# Patient Record
Sex: FEMALE | Race: WHITE | NOT HISPANIC OR LATINO | URBAN - METROPOLITAN AREA
[De-identification: names, ages, dates, MRNs, and addresses within clinical notes are randomized per-mention and may not be internally consistent; named-entity substitution may affect disease eponyms.]

---

## 2022-04-28 ENCOUNTER — OFFICE VISIT (OUTPATIENT)
Dept: URBAN - METROPOLITAN AREA CLINIC 51 | Facility: CLINIC | Age: 67
End: 2022-04-28
Payer: COMMERCIAL

## 2022-04-28 DIAGNOSIS — H43.313 VITREOUS MEMBRANES AND STRANDS, BILATERAL: ICD-10-CM

## 2022-04-28 DIAGNOSIS — H02.834 DERMATOCHALASIS OF LEFT UPPER EYELID: ICD-10-CM

## 2022-04-28 DIAGNOSIS — Z79.84 LONG TERM (CURRENT) USE OF ORAL ANTIDIABETIC DRUGS: ICD-10-CM

## 2022-04-28 DIAGNOSIS — H40.013 OPEN ANGLE WITH BORDERLINE FINDINGS, LOW RISK, BILATERAL: ICD-10-CM

## 2022-04-28 DIAGNOSIS — H35.363 DRUSEN (DEGENERATIVE) OF MACULA, BILATERAL: ICD-10-CM

## 2022-04-28 DIAGNOSIS — H52.4 PRESBYOPIA: ICD-10-CM

## 2022-04-28 DIAGNOSIS — H00.022 HORDEOLUM INTERNUM RIGHT LOWER EYELID: Primary | ICD-10-CM

## 2022-04-28 DIAGNOSIS — H25.13 AGE-RELATED NUCLEAR CATARACT, BILATERAL: ICD-10-CM

## 2022-04-28 DIAGNOSIS — E11.9 TYPE 2 DIABETES MELLITUS WITHOUT COMPLICATIONS: ICD-10-CM

## 2022-04-28 DIAGNOSIS — H02.831 DERMATOCHALASIS OF RIGHT UPPER EYELID: ICD-10-CM

## 2022-04-28 DIAGNOSIS — H44.23 DEGENERATIVE MYOPIA, BILATERAL: ICD-10-CM

## 2022-04-28 PROCEDURE — 92133 CPTRZD OPH DX IMG PST SGM ON: CPT | Performed by: OPTOMETRIST

## 2022-04-28 PROCEDURE — 99204 OFFICE O/P NEW MOD 45 MIN: CPT | Performed by: OPTOMETRIST

## 2022-04-28 PROCEDURE — 92250 FUNDUS PHOTOGRAPHY W/I&R: CPT | Performed by: OPTOMETRIST

## 2022-04-28 PROCEDURE — 92134 CPTRZ OPH DX IMG PST SGM RTA: CPT | Performed by: OPTOMETRIST

## 2022-04-28 RX ORDER — OMEPRAZOLE 20 MG/1
20 MG CAPSULE, DELAYED RELEASE ORAL
Qty: 0 | Refills: 0 | Status: ACTIVE
Start: 2022-04-28

## 2022-04-28 RX ORDER — NEOMYCIN SULFATE, POLYMYXIN B SULFATE AND DEXAMETHASONE 3.5; 10000; 1 MG/G; [USP'U]/G; MG/G
OINTMENT OPHTHALMIC
Qty: 3.5 | Refills: 0 | Status: ACTIVE
Start: 2022-04-28

## 2022-04-28 RX ORDER — TOBRAMYCIN AND DEXAMETHASONE 3; 1 MG/G; MG/G
OINTMENT OPHTHALMIC
Qty: 5 | Refills: 0 | Status: ACTIVE
Start: 2022-04-28

## 2022-04-28 RX ORDER — LEVOTHYROXINE SODIUM 75 UG/1
CAPSULE ORAL
Qty: 0 | Refills: 0 | Status: ACTIVE
Start: 2022-04-28

## 2022-04-28 RX ORDER — METFORMIN HYDROCHLORIDE 500 MG/1
500 MG TABLET, FILM COATED ORAL
Qty: 0 | Refills: 0 | Status: ACTIVE
Start: 2022-04-28

## 2022-04-28 ASSESSMENT — VISUAL ACUITY
OD: 20/25
OS: 20/20

## 2022-04-28 ASSESSMENT — INTRAOCULAR PRESSURE
OD: 17
OS: 17

## 2022-04-28 NOTE — IMPRESSION/PLAN
Impression: Drusen (degenerative) of macula, bilateral: H35.363. Plan: Minor. Recommend dark-leafy vegetables, smoking cessation, sun protection. RTC if any visual distortion noted.

## 2022-04-28 NOTE — IMPRESSION/PLAN
Impression: Age-related nuclear cataract, bilateral: H25.13. Plan: Not visually significant, cataract extraction not indicated at this time. Sunglasses/sun protection when outdoors. Continue to monitor. RTC if ADL affected.

## 2022-04-28 NOTE — IMPRESSION/PLAN
Impression: Diagnosis: Open angle with borderline findings, low risk, bilateral.
*IOPs today 17mmHg OD and 17mmHg OS without medication *OCT RNFL (04/28/2022) OD: 108um; borderline thinning sup OS: 98um; borderline thinning sup *Prev REFERRED BY DR Annette Howell OD 
*GONIO GRADE 3 OU *PACHS [id number] *PT DENIES SULFA ALLERGY
*PT DENIES LUNG DZ Plan: I counseled the patient regarding the following: Glaucoma suspects do not usually need to be treated but do need close follow up monitoring for early signs of glaucoma damage. Some glaucoma suspects require treatment, and the same medical options for glaucoma eye drops and pills are utilized. Laser procedures and intraocular surgery are usually reserved for patients with uncontrolled glaucoma. Expectations: Glaucoma is usually diagnosed when the following three criteria are present: elevated intraocular pressure, optic nerve damage, and visual field loss. When some of these criteria are missing, it can be challenging to make the diagnosis of glaucoma. Glaucoma suspects may also include individuals with other risk factors such as a family history of glaucoma, temporary elevations of IO when taking steroid medications, pseudoexfoliation syndrome, pigmentary dispersion syndrome, thin central corneal thickness, and optic disc hemorrhages. Check IOP, OCT RNFL and HVF annually at [facility].

## 2022-04-28 NOTE — IMPRESSION/PLAN
Impression: Hordeolum internum right lower eyelid: H00.022. Plan: Reviewed with patient findings. Start Maxitrol GRACE QHS OD applying to affected site till [facility] is empty Warm compresses for 10 minutes of constant heat twice daily, which is the more important part of the treatment. (wet washcloth does not stay heated consistently, prefer Anita's Mask or rice in clean sock method) Lid cleansers such as Theratears HypoChlor solution on a clean cotton pad (no cotton balls) and gently swipe along closed eyelid margins twice daily Refer for removal/further treatment if no improvement in <3weeks

## 2022-04-28 NOTE — IMPRESSION/PLAN
Impression: Type 2 diabetes mellitus without complications: R39.1. *Diagnosed since 2009, manged with Metformin *Last A1C- below 6 Feb 11, 2022. *Last BG - 136 taken last week, non-fasting. *PCP: Dr. Lexi Blanchard: No Background Diabetic Retinopathy, no Diabetic Macular Edema and no Neovascularization of the iris, disc, or elsewhere. Disc. ocular and systemic benefits of blood sugar control. The American Diabetes Association recommends target glycohemoglobin at 7.0% or less to minimize incidence of retinopathy as well as other systemic complications of diabetes mellitus. Send notes to PCP. Check annually.

## 2022-04-28 NOTE — IMPRESSION/PLAN
Impression: Long term (current) use of oral antidiabetic drugs: Z79.84.  Plan: See diabetic plan Repair Type: Complex Repair

## 2022-04-28 NOTE — IMPRESSION/PLAN
Impression: Degenerative myopia, bilateral: H44.23.
*lattice degeneration OU Plan: Discussed signs/symptoms of RD. RTC immed if noticing any flashes/floaters/curtain veil.